# Patient Record
Sex: FEMALE | Race: WHITE | URBAN - METROPOLITAN AREA
[De-identification: names, ages, dates, MRNs, and addresses within clinical notes are randomized per-mention and may not be internally consistent; named-entity substitution may affect disease eponyms.]

---

## 2023-04-11 ENCOUNTER — LAB OUTSIDE AN ENCOUNTER (OUTPATIENT)
Dept: URBAN - METROPOLITAN AREA CLINIC 72 | Facility: CLINIC | Age: 73
End: 2023-04-11

## 2023-04-11 ENCOUNTER — WEB ENCOUNTER (OUTPATIENT)
Dept: URBAN - METROPOLITAN AREA CLINIC 72 | Facility: CLINIC | Age: 73
End: 2023-04-11

## 2023-04-11 ENCOUNTER — OFFICE VISIT (OUTPATIENT)
Dept: URBAN - METROPOLITAN AREA CLINIC 72 | Facility: CLINIC | Age: 73
End: 2023-04-11
Payer: MEDICARE

## 2023-04-11 VITALS
WEIGHT: 127 LBS | TEMPERATURE: 97.9 F | HEART RATE: 77 BPM | DIASTOLIC BLOOD PRESSURE: 76 MMHG | RESPIRATION RATE: 18 BRPM | BODY MASS INDEX: 23.37 KG/M2 | HEIGHT: 62 IN | SYSTOLIC BLOOD PRESSURE: 117 MMHG

## 2023-04-11 DIAGNOSIS — R14.0 BLOATING: ICD-10-CM

## 2023-04-11 DIAGNOSIS — D13.2 DUODENAL ADENOMA: ICD-10-CM

## 2023-04-11 DIAGNOSIS — K21.9 GASTROESOPHAGEAL REFLUX DISEASE WITHOUT ESOPHAGITIS: ICD-10-CM

## 2023-04-11 DIAGNOSIS — Z12.11 COLON CANCER SCREENING: ICD-10-CM

## 2023-04-11 DIAGNOSIS — K31.7 GASTRIC POLYP: ICD-10-CM

## 2023-04-11 PROBLEM — 78809005: Status: ACTIVE | Noted: 2023-04-11

## 2023-04-11 PROBLEM — 266435005: Status: ACTIVE | Noted: 2023-04-11

## 2023-04-11 PROCEDURE — 99204 OFFICE O/P NEW MOD 45 MIN: CPT | Performed by: INTERNAL MEDICINE

## 2023-04-11 RX ORDER — FLUTICASONE PROPIONATE 50 UG/1
SPRAY 2 SPRAYS IN EACH NOSTRIL ONCE DAILY SPRAY, METERED NASAL
Qty: 48 GRAM | Refills: 0 | Status: ACTIVE | COMMUNITY

## 2023-04-11 RX ORDER — PREDNISONE 10 MG/1
TABLET ORAL
Qty: 33 TABLET | Status: ACTIVE | COMMUNITY

## 2023-04-11 RX ORDER — MONTELUKAST 10 MG/1
TABLET, FILM COATED ORAL
Qty: 90 TABLET | Status: ACTIVE | COMMUNITY

## 2023-04-11 RX ORDER — FLUTICASONE PROPIONATE AND SALMETEROL 50; 250 UG/1; UG/1
INHALE 1 PUFF BY MOUTH TWICE DAILY POWDER RESPIRATORY (INHALATION)
Qty: 120 EACH | Refills: 0 | Status: ACTIVE | COMMUNITY

## 2023-04-11 RX ORDER — SIMVASTATIN 10 MG/1
TABLET, FILM COATED ORAL
Qty: 90 TABLET | Status: ACTIVE | COMMUNITY

## 2023-04-11 RX ORDER — METHYLPREDNISOLONE 4 MG/1
TABLET ORAL
Qty: 21 TABLET | Status: ACTIVE | COMMUNITY

## 2023-04-11 RX ORDER — LOSARTAN POTASSIUM AND HYDROCHLOROTHIAZIDE 50; 12.5 MG/1; MG/1
TAKE 1 TABLET BY MOUTH EVERY DAY TABLET, FILM COATED ORAL
Qty: 90 EACH | Refills: 1 | Status: ACTIVE | COMMUNITY

## 2023-04-11 RX ORDER — DOXYCYCLINE 100 MG/1
CAPSULE ORAL
Qty: 14 CAPSULE | Status: ACTIVE | COMMUNITY

## 2023-04-11 RX ORDER — PANTOPRAZOLE SODIUM 40 MG/1
TABLET, DELAYED RELEASE ORAL
Qty: 90 TABLET | Status: ACTIVE | COMMUNITY

## 2023-04-11 NOTE — HPI-TODAY'S VISIT:
Mrs. Hill is a pleasant 72-year-old female who presents as a new patient for consultation for GERD with a history of gastric polyps and duodenal polyps needing EGD, she was referred by Dr. Katlyn Wagoner.  A copy of this consultation will be forwarded to the referring physician.  Her last colonoscopy was in 2002, she had internal/external hemorrhoids no polyps.  A 3-year colonoscopy was recommended at that point time for unknown reasons.  She had an upper endoscopy in 2015, this was done for reflux, a hiatal hernia, gastric polyps, gastritis were noted pathology returned showing mild chronic inflammation of the antrum, fundic gland polyp.  A repeat EGD was done in 2020 that showed a tubular adenoma of the duodenum, reactive changes at the stomach, nonspecific carditis.  She is on PPI therapy with a dose of 40 mg daily.  She reports issues with gas and bloating and reflux.

## 2023-07-05 ENCOUNTER — TELEPHONE ENCOUNTER (OUTPATIENT)
Dept: URBAN - METROPOLITAN AREA CLINIC 72 | Facility: CLINIC | Age: 73
End: 2023-07-05

## 2023-07-10 ENCOUNTER — OFFICE VISIT (OUTPATIENT)
Dept: URBAN - METROPOLITAN AREA MEDICAL CENTER 40 | Facility: MEDICAL CENTER | Age: 73
End: 2023-07-10
Payer: MEDICARE

## 2023-07-10 DIAGNOSIS — K31.7 GASTRIC POLYP: ICD-10-CM

## 2023-07-10 DIAGNOSIS — Z12.11 COLON CANCER SCREENING: ICD-10-CM

## 2023-07-10 DIAGNOSIS — Z87.19 HISTORY OF GASTRIC POLYP: ICD-10-CM

## 2023-07-10 DIAGNOSIS — K21.9 GASTROESOPHAGEAL REFLUX DISEASE WITHOUT ESOPHAGITIS: ICD-10-CM

## 2023-07-10 DIAGNOSIS — D13.2 DUODENAL ADENOMA: ICD-10-CM

## 2023-07-10 PROCEDURE — 43239 EGD BIOPSY SINGLE/MULTIPLE: CPT | Performed by: INTERNAL MEDICINE

## 2023-07-10 PROCEDURE — G0121 COLON CA SCRN NOT HI RSK IND: HCPCS | Performed by: INTERNAL MEDICINE

## 2023-07-26 ENCOUNTER — DASHBOARD ENCOUNTERS (OUTPATIENT)
Age: 73
End: 2023-07-26

## 2023-07-26 ENCOUNTER — OFFICE VISIT (OUTPATIENT)
Dept: URBAN - METROPOLITAN AREA CLINIC 72 | Facility: CLINIC | Age: 73
End: 2023-07-26
Payer: MEDICARE

## 2023-07-26 VITALS
BODY MASS INDEX: 23.85 KG/M2 | TEMPERATURE: 96.4 F | HEART RATE: 107 BPM | HEIGHT: 62 IN | SYSTOLIC BLOOD PRESSURE: 122 MMHG | WEIGHT: 129.6 LBS | DIASTOLIC BLOOD PRESSURE: 87 MMHG

## 2023-07-26 DIAGNOSIS — J04.0 LARYNGITIS: ICD-10-CM

## 2023-07-26 DIAGNOSIS — K21.9 GASTROESOPHAGEAL REFLUX DISEASE WITHOUT ESOPHAGITIS: ICD-10-CM

## 2023-07-26 DIAGNOSIS — R05.8 PRODUCTIVE COUGH: ICD-10-CM

## 2023-07-26 DIAGNOSIS — D13.2 DUODENAL ADENOMA: ICD-10-CM

## 2023-07-26 PROCEDURE — 99214 OFFICE O/P EST MOD 30 MIN: CPT | Performed by: NURSE PRACTITIONER

## 2023-07-26 RX ORDER — MONTELUKAST 10 MG/1
TABLET, FILM COATED ORAL
Qty: 90 TABLET | Status: ACTIVE | COMMUNITY

## 2023-07-26 RX ORDER — AZITHROMYCIN DIHYDRATE 250 MG/1
AS DIRECTED TABLET, FILM COATED ORAL DAILY
Qty: 1 PACK | Refills: 0 | OUTPATIENT
Start: 2023-07-26

## 2023-07-26 RX ORDER — PREDNISONE 10 MG/1
TABLET ORAL
Qty: 33 TABLET | Status: ON HOLD | COMMUNITY

## 2023-07-26 RX ORDER — PANTOPRAZOLE SODIUM 40 MG/1
TABLET, DELAYED RELEASE ORAL
Qty: 90 TABLET | Status: ACTIVE | COMMUNITY

## 2023-07-26 RX ORDER — DOXYCYCLINE 100 MG/1
CAPSULE ORAL
Qty: 14 CAPSULE | Status: ON HOLD | COMMUNITY

## 2023-07-26 RX ORDER — FLUTICASONE PROPIONATE AND SALMETEROL 50; 250 UG/1; UG/1
INHALE 1 PUFF BY MOUTH TWICE DAILY POWDER RESPIRATORY (INHALATION)
Qty: 120 EACH | Refills: 0 | Status: ACTIVE | COMMUNITY

## 2023-07-26 RX ORDER — LOSARTAN POTASSIUM AND HYDROCHLOROTHIAZIDE 50; 12.5 MG/1; MG/1
TAKE 1 TABLET BY MOUTH EVERY DAY TABLET, FILM COATED ORAL
Qty: 90 EACH | Refills: 1 | Status: ACTIVE | COMMUNITY

## 2023-07-26 RX ORDER — METHYLPREDNISOLONE 4 MG/1
TABLET ORAL
Qty: 21 TABLET | Status: ACTIVE | COMMUNITY

## 2023-07-26 RX ORDER — SIMVASTATIN 10 MG/1
TABLET, FILM COATED ORAL
Qty: 90 TABLET | Status: ACTIVE | COMMUNITY

## 2023-07-26 RX ORDER — FLUTICASONE PROPIONATE 50 UG/1
SPRAY 2 SPRAYS IN EACH NOSTRIL ONCE DAILY SPRAY, METERED NASAL
Qty: 48 GRAM | Refills: 0 | Status: ACTIVE | COMMUNITY

## 2023-07-26 NOTE — HPI-TODAY'S VISIT:
73-year-old female here for EGD/Colon follow-up.    EGD/colonoscopy 7/10/2023.  EGD revealed a polyp in D2 removed via forceps.  Possible fundic gland polyps in the stomach 1 polyp did appear sessile 3 mm in size which was biopsied.  1 cm sliding hiatal hernia.  Colonoscopy revealed diverticulosis otherwise normal.  Duodenal biopsy was tubular adenoma.  Gastric polyp biopsy was fundic gland polyp and esophageal biopsy revealed gastric type Culmer mucosa no intestinal metaplasia or dysplasia.  Recommend repeat EGD in 1 year.  Recall added.  Her last colonoscopy was in 2002, she had internal/external hemorrhoids no polyps.  A 3-year colonoscopy was recommended at that point time for unknown reasons.  She had an upper endoscopy in 2015, this was done for reflux, a hiatal hernia, gastric polyps, gastritis were noted pathology returned showing mild chronic inflammation of the antrum, fundic gland polyp.  A repeat EGD was done in 2020 that showed a tubular adenoma of the duodenum, reactive changes at the stomach, nonspecific carditis.  On inerview today GERD controlled with PPI therapy with a dose of 40 mg daily.  Gets laryngitis occasionally and has it now. Has a productive cough. No fever or chills.  Is on allergy medicine every other night and has inhalers that she uses routinely.  Denies SOB.

## 2025-02-05 PROBLEM — 78809005: Status: ACTIVE | Noted: 2025-02-05

## 2025-02-05 PROBLEM — 73861008: Status: ACTIVE | Noted: 2025-02-05

## 2025-02-06 ENCOUNTER — OFFICE VISIT (OUTPATIENT)
Dept: URBAN - METROPOLITAN AREA CLINIC 72 | Facility: CLINIC | Age: 75
End: 2025-02-06
Payer: MEDICARE

## 2025-02-06 ENCOUNTER — LAB OUTSIDE AN ENCOUNTER (OUTPATIENT)
Dept: URBAN - METROPOLITAN AREA CLINIC 72 | Facility: CLINIC | Age: 75
End: 2025-02-06

## 2025-02-06 VITALS
HEART RATE: 77 BPM | HEIGHT: 62 IN | TEMPERATURE: 97.9 F | BODY MASS INDEX: 24.14 KG/M2 | DIASTOLIC BLOOD PRESSURE: 63 MMHG | WEIGHT: 131.2 LBS | SYSTOLIC BLOOD PRESSURE: 125 MMHG

## 2025-02-06 DIAGNOSIS — K21.9 GASTROESOPHAGEAL REFLUX DISEASE WITHOUT ESOPHAGITIS: ICD-10-CM

## 2025-02-06 DIAGNOSIS — K31.7 GASTRIC POLYPS: ICD-10-CM

## 2025-02-06 DIAGNOSIS — D13.2 ADENOMATOUS DUODENAL POLYP: ICD-10-CM

## 2025-02-06 PROCEDURE — 99214 OFFICE O/P EST MOD 30 MIN: CPT

## 2025-02-06 RX ORDER — SIMVASTATIN 5 MG
1 TABLET IN THE EVENING TABLET ORAL ONCE A DAY
Qty: 90 TABLET | Status: ACTIVE | COMMUNITY

## 2025-02-06 RX ORDER — PANTOPRAZOLE SODIUM 40 MG/1
1 TAB TABLET, DELAYED RELEASE ORAL EVERY OTHER DAY
Status: ACTIVE | COMMUNITY

## 2025-02-06 RX ORDER — FLUTICASONE PROPIONATE AND SALMETEROL 50; 250 UG/1; UG/1
INHALE 1 PUFF BY MOUTH TWICE DAILY POWDER RESPIRATORY (INHALATION)
Qty: 120 EACH | Refills: 0 | Status: ACTIVE | COMMUNITY

## 2025-02-06 RX ORDER — AZITHROMYCIN DIHYDRATE 250 MG/1
AS DIRECTED TABLET, FILM COATED ORAL DAILY
Qty: 1 PACK | Refills: 0 | Status: ON HOLD | COMMUNITY
Start: 2023-07-26

## 2025-02-06 RX ORDER — PANTOPRAZOLE SODIUM 20 MG/1
1 TABLET 1/2 TO 1 HOUR BEFORE MORNING MEAL TABLET, DELAYED RELEASE ORAL ONCE A DAY
Qty: 30 | Refills: 1 | OUTPATIENT
Start: 2025-02-06

## 2025-02-06 RX ORDER — FAMOTIDINE 40 MG/1
1 TABLET TABLET, FILM COATED ORAL TWICE A DAY
Qty: 180 TABLET | Refills: 3 | OUTPATIENT
Start: 2025-02-06

## 2025-02-06 RX ORDER — DOXYCYCLINE 100 MG/1
CAPSULE ORAL
Qty: 14 CAPSULE | Status: ON HOLD | COMMUNITY

## 2025-02-06 RX ORDER — LOSARTAN POTASSIUM AND HYDROCHLOROTHIAZIDE 50; 12.5 MG/1; MG/1
TAKE 1 TABLET BY MOUTH EVERY DAY TABLET, FILM COATED ORAL
Qty: 90 EACH | Refills: 1 | Status: ACTIVE | COMMUNITY

## 2025-02-06 RX ORDER — MONTELUKAST 10 MG/1
TABLET, FILM COATED ORAL
Qty: 90 TABLET | Status: ACTIVE | COMMUNITY

## 2025-02-06 RX ORDER — PREDNISONE 10 MG/1
TABLET ORAL
Qty: 33 TABLET | Status: ON HOLD | COMMUNITY

## 2025-02-06 RX ORDER — METHYLPREDNISOLONE 4 MG/1
AS DIRECTED TABLET ORAL AS NEEDED
Status: ACTIVE | COMMUNITY

## 2025-02-06 RX ORDER — FLUTICASONE PROPIONATE 50 UG/1
SPRAY 2 SPRAYS IN EACH NOSTRIL ONCE DAILY SPRAY, METERED NASAL
Qty: 48 GRAM | Refills: 0 | Status: ON HOLD | COMMUNITY

## 2025-02-06 NOTE — HPI-OTHER HISTORIES
Procedures:   7/10/2023 - EGD/colonoscopy:  EGD revealed a polyp in D2 removed via forceps. Possible fundic gland polyps in the stomach 1 polyp did appear sessile 3 mm in size which was biopsied. 1 cm sliding hiatal hernia. Colonoscopy revealed diverticulosis otherwise normal. Duodenal biopsy was tubular adenoma. Gastric polyp biopsy was fundic gland polyp and esophageal biopsy revealed gastric type Columnar mucosa no intestinal metaplasia or dysplasia. Recommend repeat EGD in 1 year. Due 7/2024.

## 2025-02-06 NOTE — HPI-TODAY'S VISIT:
Patient is a 74-year-old female last seen in the office on 7/26/2023 after EGD/colonoscopy.  At that time, patient's GERD was controlled on pantoprazole 40 mg daily.  Due to a fundic gland polyp which did appear sessile repeat EGD was recommended in 1 year.  Patient had a normal colonoscopy in 23 and was given a 10-year recall.  Patient is here today for EGD consult.  Patient is here and states that she has cut back on her PPI to QOD. She does not usually have GERD. She has some nausea/epigastric discomfort. Discussed hiatal hernia. Has changed her diet. Has alot of gas. She swicthed to lactose free and plant based lactose.

## 2025-04-28 ENCOUNTER — TELEPHONE ENCOUNTER (OUTPATIENT)
Dept: URBAN - METROPOLITAN AREA CLINIC 72 | Facility: CLINIC | Age: 75
End: 2025-04-28

## 2025-05-02 ENCOUNTER — OFFICE VISIT (OUTPATIENT)
Dept: URBAN - METROPOLITAN AREA MEDICAL CENTER 40 | Facility: MEDICAL CENTER | Age: 75
End: 2025-05-02
Payer: MEDICARE

## 2025-05-02 DIAGNOSIS — K31.89 OTHER DISEASES OF STOMACH AND DUODENUM: ICD-10-CM

## 2025-05-02 DIAGNOSIS — K22.70 BARRETT ESOPHAGUS: ICD-10-CM

## 2025-05-02 PROCEDURE — 43239 EGD BIOPSY SINGLE/MULTIPLE: CPT | Performed by: INTERNAL MEDICINE

## 2025-05-02 RX ORDER — FLUTICASONE PROPIONATE AND SALMETEROL 50; 250 UG/1; UG/1
INHALE 1 PUFF BY MOUTH TWICE DAILY POWDER RESPIRATORY (INHALATION)
Qty: 120 EACH | Refills: 0 | Status: ACTIVE | COMMUNITY

## 2025-05-02 RX ORDER — SIMVASTATIN 5 MG
1 TABLET IN THE EVENING TABLET ORAL ONCE A DAY
Qty: 90 TABLET | Status: ACTIVE | COMMUNITY

## 2025-05-02 RX ORDER — FAMOTIDINE 40 MG/1
1 TABLET TABLET, FILM COATED ORAL TWICE A DAY
Qty: 180 TABLET | Refills: 3 | Status: ACTIVE | COMMUNITY
Start: 2025-02-06

## 2025-05-02 RX ORDER — DOXYCYCLINE 100 MG/1
CAPSULE ORAL
Qty: 14 CAPSULE | Status: ON HOLD | COMMUNITY

## 2025-05-02 RX ORDER — LOSARTAN POTASSIUM AND HYDROCHLOROTHIAZIDE 50; 12.5 MG/1; MG/1
TAKE 1 TABLET BY MOUTH EVERY DAY TABLET, FILM COATED ORAL
Qty: 90 EACH | Refills: 1 | Status: ACTIVE | COMMUNITY

## 2025-05-02 RX ORDER — MONTELUKAST 10 MG/1
TABLET, FILM COATED ORAL
Qty: 90 TABLET | Status: ACTIVE | COMMUNITY

## 2025-05-02 RX ORDER — METHYLPREDNISOLONE 4 MG/1
AS DIRECTED TABLET ORAL AS NEEDED
Status: ACTIVE | COMMUNITY

## 2025-05-02 RX ORDER — PANTOPRAZOLE SODIUM 20 MG/1
1 TABLET 1/2 TO 1 HOUR BEFORE MORNING MEAL TABLET, DELAYED RELEASE ORAL ONCE A DAY
Qty: 30 | Refills: 1 | Status: ACTIVE | COMMUNITY
Start: 2025-02-06

## 2025-05-02 RX ORDER — PANTOPRAZOLE SODIUM 40 MG/1
1 TAB TABLET, DELAYED RELEASE ORAL EVERY OTHER DAY
Status: ACTIVE | COMMUNITY

## 2025-05-02 RX ORDER — FLUTICASONE PROPIONATE 50 UG/1
SPRAY 2 SPRAYS IN EACH NOSTRIL ONCE DAILY SPRAY, METERED NASAL
Qty: 48 GRAM | Refills: 0 | Status: ON HOLD | COMMUNITY

## 2025-05-02 RX ORDER — PREDNISONE 10 MG/1
TABLET ORAL
Qty: 33 TABLET | Status: ON HOLD | COMMUNITY

## 2025-05-02 RX ORDER — AZITHROMYCIN DIHYDRATE 250 MG/1
AS DIRECTED TABLET, FILM COATED ORAL DAILY
Qty: 1 PACK | Refills: 0 | Status: ON HOLD | COMMUNITY
Start: 2023-07-26

## 2025-05-22 PROBLEM — 40719004: Status: ACTIVE | Noted: 2025-05-22

## 2025-05-23 ENCOUNTER — OFFICE VISIT (OUTPATIENT)
Dept: URBAN - METROPOLITAN AREA CLINIC 72 | Facility: CLINIC | Age: 75
End: 2025-05-23
Payer: MEDICARE

## 2025-05-23 DIAGNOSIS — K21.9 GASTROESOPHAGEAL REFLUX DISEASE WITHOUT ESOPHAGITIS: ICD-10-CM

## 2025-05-23 DIAGNOSIS — K22.10 EROSIVE ESOPHAGITIS: ICD-10-CM

## 2025-05-23 DIAGNOSIS — K31.7 GASTRIC POLYPS: ICD-10-CM

## 2025-05-23 DIAGNOSIS — D13.2 ADENOMATOUS DUODENAL POLYP: ICD-10-CM

## 2025-05-23 PROCEDURE — 99214 OFFICE O/P EST MOD 30 MIN: CPT

## 2025-05-23 RX ORDER — FLUTICASONE PROPIONATE 50 UG/1
SPRAY 2 SPRAYS IN EACH NOSTRIL ONCE DAILY SPRAY, METERED NASAL
Qty: 48 GRAM | Refills: 0 | Status: ON HOLD | COMMUNITY

## 2025-05-23 RX ORDER — AZITHROMYCIN DIHYDRATE 250 MG/1
AS DIRECTED TABLET, FILM COATED ORAL DAILY
Qty: 1 PACK | Refills: 0 | Status: ON HOLD | COMMUNITY
Start: 2023-07-26

## 2025-05-23 RX ORDER — PANTOPRAZOLE SODIUM 40 MG/1
1 TABLET 1/2 TO 1 HOUR BEFORE MORNING MEAL TABLET, DELAYED RELEASE ORAL ONCE A DAY
Qty: 90 | Refills: 3 | OUTPATIENT
Start: 2025-05-23

## 2025-05-23 RX ORDER — PANTOPRAZOLE SODIUM 20 MG/1
1 TABLET 1/2 TO 1 HOUR BEFORE MORNING MEAL TABLET, DELAYED RELEASE ORAL ONCE A DAY
Qty: 30 | Refills: 1 | Status: ACTIVE | COMMUNITY
Start: 2025-02-06

## 2025-05-23 RX ORDER — MONTELUKAST 10 MG/1
TABLET, FILM COATED ORAL
Qty: 90 TABLET | Status: ACTIVE | COMMUNITY

## 2025-05-23 RX ORDER — FLUTICASONE PROPIONATE AND SALMETEROL 50; 250 UG/1; UG/1
INHALE 1 PUFF BY MOUTH TWICE DAILY POWDER RESPIRATORY (INHALATION)
Qty: 120 EACH | Refills: 0 | Status: ACTIVE | COMMUNITY

## 2025-05-23 RX ORDER — METHYLPREDNISOLONE 4 MG/1
AS DIRECTED TABLET ORAL AS NEEDED
Status: ACTIVE | COMMUNITY

## 2025-05-23 RX ORDER — SIMVASTATIN 5 MG
1 TABLET IN THE EVENING TABLET ORAL ONCE A DAY
Qty: 90 TABLET | Status: ACTIVE | COMMUNITY

## 2025-05-23 RX ORDER — FAMOTIDINE 40 MG/1
1 TABLET TABLET, FILM COATED ORAL TWICE A DAY
Qty: 180 TABLET | Refills: 3 | Status: ACTIVE | COMMUNITY
Start: 2025-02-06

## 2025-05-23 RX ORDER — LOSARTAN POTASSIUM AND HYDROCHLOROTHIAZIDE 50; 12.5 MG/1; MG/1
TAKE 1 TABLET BY MOUTH EVERY DAY TABLET, FILM COATED ORAL
Qty: 90 EACH | Refills: 1 | Status: ACTIVE | COMMUNITY

## 2025-05-23 RX ORDER — PREDNISONE 10 MG/1
TABLET ORAL
Qty: 33 TABLET | Status: ON HOLD | COMMUNITY

## 2025-05-23 RX ORDER — DOXYCYCLINE 100 MG/1
CAPSULE ORAL
Qty: 14 CAPSULE | Status: ON HOLD | COMMUNITY

## 2025-05-23 RX ORDER — PANTOPRAZOLE SODIUM 40 MG/1
1 TAB TABLET, DELAYED RELEASE ORAL EVERY OTHER DAY
Status: ACTIVE | COMMUNITY

## 2025-05-23 NOTE — HPI-OTHER HISTORIES
Procedures:  5/2/2025-EGD: Grade B erosive esophagitis identified.  Affected area was erosive, erythematous, exhibiting exudate and friable.  Esophagitis was moderately severe.  Multiple biopsies were collected.  Squamocolumnar junction appeared regular was located 38 cm from incisors.  Examination of the stomach revealed hiatal hernia 2 cm in length and sliding.  Multiple gastric polyps were identified suggestive of fundic gland polyps.  Path: Stomach polyp showed antral type gastric mucosa with mild foveolar hyperplasia.  Esophageal biopsy showed squamocolumnar junction with mild chronic inflammation and focal intestinal metaplasia of glandular mucosa consistent with Pal's esophagus.  Pal's was not noted on EGD.  7/10/2023 - EGD/colonoscopy:  EGD revealed a polyp in D2 removed via forceps. Possible fundic gland polyps in the stomach 1 polyp did appear sessile 3 mm in size which was biopsied. 1 cm sliding hiatal hernia. Colonoscopy revealed diverticulosis otherwise normal. Duodenal biopsy was tubular adenoma. Gastric polyp biopsy was fundic gland polyp and esophageal biopsy revealed gastric type Columnar mucosa no intestinal metaplasia or dysplasia. Recommend repeat EGD in 1 year. Due 7/2024.

## 2025-05-23 NOTE — HPI-TODAY'S VISIT:
Patient is a 75-year-old female last seen in the office on 2/6/2025 for gastric polyps, GERD, adenomatous duodenal polyp.  Patient is taking pantoprazole 20 mg daily and famotidine 40 mg twice daily.  Due to EGD results, patient should maintain on PPI therapy.  She had an EGD performed on 5/2/2025 and is here for follow-up.  Patient is seen today and we reviewed her results of EGD. Will go back to 40 mg pantoprazole daily and famotidine 40 mg .